# Patient Record
Sex: FEMALE | Race: WHITE
[De-identification: names, ages, dates, MRNs, and addresses within clinical notes are randomized per-mention and may not be internally consistent; named-entity substitution may affect disease eponyms.]

---

## 2014-06-02 VITALS
SYSTOLIC BLOOD PRESSURE: 95 MMHG | SYSTOLIC BLOOD PRESSURE: 95 MMHG | DIASTOLIC BLOOD PRESSURE: 63 MMHG | DIASTOLIC BLOOD PRESSURE: 63 MMHG | DIASTOLIC BLOOD PRESSURE: 63 MMHG | SYSTOLIC BLOOD PRESSURE: 95 MMHG

## 2016-05-09 VITALS — DIASTOLIC BLOOD PRESSURE: 83 MMHG | SYSTOLIC BLOOD PRESSURE: 144 MMHG

## 2017-11-07 ENCOUNTER — HOSPITAL ENCOUNTER (OUTPATIENT)
Dept: HOSPITAL 63 - DXRAD | Age: 47
Discharge: HOME | End: 2017-11-07
Attending: FAMILY MEDICINE
Payer: COMMERCIAL

## 2017-11-07 DIAGNOSIS — M25.531: Primary | ICD-10-CM

## 2017-11-07 PROCEDURE — 73110 X-RAY EXAM OF WRIST: CPT

## 2017-11-07 NOTE — RAD
4 view study of the right wrist:



Indications: Injured right wrist 6 weeks ago while on job. Continued right

wrist pain.



Findings: No acute fracture or dislocation or osteolytic process is seen.

Alignment is normal. No significant arthritic change is seen.



IMPRESSION: No significant osseous abnormality is evident.

## 2018-05-18 ENCOUNTER — HOSPITAL ENCOUNTER (OUTPATIENT)
Dept: HOSPITAL 63 - ER | Age: 48
Setting detail: OBSERVATION
LOS: 1 days | Discharge: HOME | End: 2018-05-19
Attending: INTERNAL MEDICINE | Admitting: INTERNAL MEDICINE
Payer: COMMERCIAL

## 2018-05-18 VITALS — BODY MASS INDEX: 32.99 KG/M2 | HEIGHT: 65 IN | WEIGHT: 198 LBS

## 2018-05-18 DIAGNOSIS — G40.309: ICD-10-CM

## 2018-05-18 DIAGNOSIS — R55: Primary | ICD-10-CM

## 2018-05-18 DIAGNOSIS — Z90.710: ICD-10-CM

## 2018-05-18 DIAGNOSIS — J32.2: ICD-10-CM

## 2018-05-18 DIAGNOSIS — I49.9: ICD-10-CM

## 2018-05-18 DIAGNOSIS — D72.829: ICD-10-CM

## 2018-05-18 DIAGNOSIS — Z90.49: ICD-10-CM

## 2018-05-18 DIAGNOSIS — Z82.49: ICD-10-CM

## 2018-05-18 DIAGNOSIS — I10: ICD-10-CM

## 2018-05-18 DIAGNOSIS — F17.210: ICD-10-CM

## 2018-05-18 DIAGNOSIS — Z82.3: ICD-10-CM

## 2018-05-18 LAB
AMPHETAMINE/METHAMPHETAMINE: (no result)
ANION GAP SERPL CALC-SCNC: 11 MMOL/L (ref 6–14)
APTT PPP: (no result) S
BACTERIA #/AREA URNS HPF: (no result) /HPF
BARBITURATES UR-MCNC: (no result) UG/ML
BASOPHILS # BLD AUTO: 0 X10^3/UL (ref 0–0.2)
BASOPHILS NFR BLD: 0 % (ref 0–3)
BENZODIAZ UR-MCNC: (no result) UG/L
BILIRUB UR QL STRIP: (no result)
CA-I SERPL ISE-MCNC: 11 MG/DL (ref 7–20)
CALCIUM SERPL-MCNC: 8.9 MG/DL (ref 8.5–10.1)
CANNABINOIDS UR-MCNC: (no result) UG/L
CHLORIDE SERPL-SCNC: 104 MMOL/L (ref 98–107)
CO2 SERPL-SCNC: 25 MMOL/L (ref 21–32)
COCAINE UR-MCNC: (no result) NG/ML
CREAT SERPL-MCNC: 0.8 MG/DL (ref 0.6–1)
EOSINOPHIL NFR BLD: 0.2 X10^3/UL (ref 0–0.7)
EOSINOPHIL NFR BLD: 1 % (ref 0–3)
ERYTHROCYTE [DISTWIDTH] IN BLOOD BY AUTOMATED COUNT: 12.7 % (ref 11.5–14.5)
ERYTHROCYTE [SEDIMENTATION RATE] IN BLOOD: 8 MM/H (ref 0–25)
FIBRINOGEN PPP-MCNC: CLEAR MG/DL
GFR SERPLBLD BASED ON 1.73 SQ M-ARVRAT: 76.9 ML/MIN
GLUCOSE SERPL-MCNC: 146 MG/DL (ref 70–99)
GLUCOSE UR STRIP-MCNC: (no result) MG/DL
HCT VFR BLD CALC: 36.5 % (ref 36–47)
HGB BLD-MCNC: 12.6 G/DL (ref 12–15.5)
LYMPHOCYTES # BLD: 2.4 X10^3/UL (ref 1–4.8)
LYMPHOCYTES NFR BLD AUTO: 17 % (ref 24–48)
MAGNESIUM SERPL-MCNC: 1.8 MG/DL (ref 1.8–2.4)
MCH RBC QN AUTO: 30 PG (ref 25–35)
MCHC RBC AUTO-ENTMCNC: 35 G/DL (ref 31–37)
MCV RBC AUTO: 88 FL (ref 79–100)
METHADONE SERPL-MCNC: (no result) NG/ML
MONO #: 0.7 X10^3/UL (ref 0–1.1)
MONOCYTES NFR BLD: 5 % (ref 0–9)
NEUT #: 10.8 X10^3UL (ref 1.8–7.7)
NEUTROPHILS NFR BLD AUTO: 76 % (ref 31–73)
NITRITE UR QL STRIP: (no result)
OPIATES UR-MCNC: (no result) NG/ML
PCP SERPL-MCNC: (no result) MG/DL
PLATELET # BLD AUTO: 283 X10^3/UL (ref 140–400)
POTASSIUM SERPL-SCNC: 3.5 MMOL/L (ref 3.5–5.1)
RBC # BLD AUTO: 4.15 X10^6/UL (ref 3.5–5.4)
RBC #/AREA URNS HPF: (no result) /HPF (ref 0–2)
SODIUM SERPL-SCNC: 140 MMOL/L (ref 136–145)
SP GR UR STRIP: 1.01
SQUAMOUS #/AREA URNS LPF: (no result) /LPF
UROBILINOGEN UR-MCNC: 0.2 MG/DL
WBC # BLD AUTO: 14.2 X10^3/UL (ref 4–11)
WBC #/AREA URNS HPF: 0 /HPF (ref 0–4)

## 2018-05-18 PROCEDURE — 85025 COMPLETE CBC W/AUTO DIFF WBC: CPT

## 2018-05-18 PROCEDURE — 96374 THER/PROPH/DIAG INJ IV PUSH: CPT

## 2018-05-18 PROCEDURE — 70450 CT HEAD/BRAIN W/O DYE: CPT

## 2018-05-18 PROCEDURE — 96361 HYDRATE IV INFUSION ADD-ON: CPT

## 2018-05-18 PROCEDURE — 93005 ELECTROCARDIOGRAM TRACING: CPT

## 2018-05-18 PROCEDURE — 36415 COLL VENOUS BLD VENIPUNCTURE: CPT

## 2018-05-18 PROCEDURE — G0378 HOSPITAL OBSERVATION PER HR: HCPCS

## 2018-05-18 PROCEDURE — G0479 DRUG TEST PRESUMP NOT OPT: HCPCS

## 2018-05-18 PROCEDURE — 81001 URINALYSIS AUTO W/SCOPE: CPT

## 2018-05-18 PROCEDURE — 85730 THROMBOPLASTIN TIME PARTIAL: CPT

## 2018-05-18 PROCEDURE — 99285 EMERGENCY DEPT VISIT HI MDM: CPT

## 2018-05-18 PROCEDURE — 85610 PROTHROMBIN TIME: CPT

## 2018-05-18 PROCEDURE — G0379 DIRECT REFER HOSPITAL OBSERV: HCPCS

## 2018-05-18 PROCEDURE — 85651 RBC SED RATE NONAUTOMATED: CPT

## 2018-05-18 PROCEDURE — 80048 BASIC METABOLIC PNL TOTAL CA: CPT

## 2018-05-18 PROCEDURE — 93880 EXTRACRANIAL BILAT STUDY: CPT

## 2018-05-18 PROCEDURE — 82553 CREATINE MB FRACTION: CPT

## 2018-05-18 PROCEDURE — 71045 X-RAY EXAM CHEST 1 VIEW: CPT

## 2018-05-18 PROCEDURE — 83880 ASSAY OF NATRIURETIC PEPTIDE: CPT

## 2018-05-18 PROCEDURE — 99406 BEHAV CHNG SMOKING 3-10 MIN: CPT

## 2018-05-18 PROCEDURE — 80307 DRUG TEST PRSMV CHEM ANLYZR: CPT

## 2018-05-18 PROCEDURE — 84484 ASSAY OF TROPONIN QUANT: CPT

## 2018-05-18 PROCEDURE — 83735 ASSAY OF MAGNESIUM: CPT

## 2018-05-18 PROCEDURE — 85379 FIBRIN DEGRADATION QUANT: CPT

## 2018-05-18 NOTE — RAD
EXAM: Head CT without contrast.

 

HISTORY: Near syncope.

 

TECHNIQUE: Computed tomographic images of the head were obtained without 

contrast. 

*One or more of the following individualized dose reduction techniques 

were utilized for this examination:  

1. Automated exposure control.  

2. Adjustment of the mA and/or kV according to patient size.  

3. Use of iterative reconstruction technique.

 

COMPARISON: None.

 

FINDINGS: There is no acute or subacute extra-axial or intraparenchymal 

hemorrhage. There is no mass effect or midline shift. There is no 

hydrocephalus. 

 

The gray-white matter differentiation pattern is intact. There is a 

prominent focus of slight hyperdensity in the region of the third 

ventricle, consistent with volume averaging of the brain parenchyma. This 

is not typical in appearance for a colloid cyst.

 

There is mild ethmoid sinus mucosal thickening. The mastoid air cells are 

unremarkable. No calvarial lesion is seen.

 

IMPRESSION: 

 

No acute intracranial findings.

 

Electronically signed by: Cindy Henley MD (5/18/2018 10:43 PM) [facility]

## 2018-05-18 NOTE — ED.ADGEN
Past History


Past Medical History:  Hypertension


Past Surgical History:  , Hysterectomy, Tonsillectomy, Tubal ligation


Smoking:  Cigarettes


Alcohol Use:  None


Drug Use:  None





Adult General


Chief Complaint


Chief Complaint


".... I don't feel right...I was trying to hang a mirror.. and I got really 

dizzy.. and had to lay down.. but I felt like I passed out... and when I woke 

up I had a very fast heart rate... I never have problems like this..."





HPI


HPI





Patient is a 47 year old female who presents with above hx and complaints of 

dizziness and possible complete syncope after laying on couch.  Pt. states she 

had a very rapid heart rate when she woke up.  Pt. did donate unit of blood 

yesterday.  Pt.states she never had syncope episode before.  Her life partner 

states she has know her for 19 yrs... and never had previous syncopal episode.  

Pt. does have HTN and recently change of meds Lisinopril  to Lisinopril with 

diuretic to reduce ankle edema.   Pt. Denies previous cardiac or dvt hx.   Does 

smoke.  No recent travel or specific ill contacts.  Pt. denies 

immunosuppression.   Pt. states upon awaking after dizzy / syncopal episode she 

felt very tired and her limbs felt very heavy.





Review of Systems


Review of Systems





Constitutional: Denies fever or chills []


Eyes: Denies change in visual acuity, redness, or eye pain []


HENT: Denies nasal congestion or sore throat []


Respiratory: Denies cough or shortness of breath []


Cardiovascular: No additional information not addressed in HPI []complaints of 

tachycardia


GI: Denies abdominal pain, nausea, vomiting, bloody stools or diarrhea []


: Denies dysuria or hematuria []


Musculoskeletal: Denies back pain or joint pain []


Integument: Denies rash or skin lesions []


Neurologic: Denies headache, focal weakness or sensory changes,. Complaints of 

dizziness


Endocrine: Denies polyuria or polydipsia []





All other systems were reviewed and found to be within normal limits, except as 

documented in this note.





Family History


Family History


Noncontributory





Current Medications


Current Medications





Current Medications








 Medications


  (Trade)  Dose


 Ordered  Sig/Zena  Start Time


 Stop Time Status Last Admin


Dose Admin


 


 Lactated Ringer's  1,000 ml @ 


 1,000 mls/hr  1X  ONCE  18 22:00


 18 22:59 DC 18 22:00


1,000 MLS/HR











Allergies


Allergies





Allergies








Coded Allergies Type Severity Reaction Last Updated Verified


 


  aspirin Allergy Intermediate Itching 8/27/15 Yes


 


  latex Allergy Intermediate Rash 8/27/15 Yes











Physical Exam


Physical Exam





Constitutional: Moderately acute distress, non-toxic appearance. []


HENT: Normocephalic, atraumatic, bilateral external ears normal, oropharynx 

moist, no oral exudates, nose normal. []


Eyes: PERRLA, EOMI, conjunctiva normal, no discharge. [] 


Neck: Normal range of motion, no tenderness, supple, no stridor. [] 


Cardiovascular: Tachycardia Heart rate regular rhythm, no murmur []


Lungs & Thorax:  Bilateral breath sounds equal with scattered wheezes 

auscultation []


Abdomen: Bowel sounds normal, soft, no tenderness, no masses, no pulsatile 

masses. Old surgery scars


Skin: Warm, dry, no erythema, no rash. [] 


Back: No tenderness, no CVA tenderness. [] 


Extremities: No tenderness, no cyanosis, no clubbing, ROM intact, no edema. [] 


Neurologic: Alert and oriented X 3, normal motor function, normal sensory 

function, no focal deficits noted. DTRs +2 patella and brachial.  equal. 

She is ambulatory without problems.


Psychologic: Affect anxious, judgement normal, mood normal. []





Current Patient Data


Lab Results





 Laboratory Tests








Test


 18


22:18 18


23:25


 


White Blood Count


 14.2 x10^3/uL


(4.0-11.0)  H 





 


Red Blood Count


 4.15 x10^6/uL


(3.50-5.40) 





 


Hemoglobin


 12.6 g/dL


(12.0-15.5) 





 


Hematocrit


 36.5 %


(36.0-47.0) 





 


Mean Corpuscular Volume


 88 fL ()


 





 


Mean Corpuscular Hemoglobin 30 pg (25-35)   


 


Mean Corpuscular Hemoglobin


Concent 35 g/dL


(31-37) 





 


Red Cell Distribution Width


 12.7 %


(11.5-14.5) 





 


Platelet Count


 283 x10^3/uL


(140-400) 





 


Neutrophils (%) (Auto) 76 % (31-73)  H 


 


Lymphocytes (%) (Auto) 17 % (24-48)  L 


 


Monocytes (%) (Auto) 5 % (0-9)   


 


Eosinophils (%) (Auto) 1 % (0-3)   


 


Basophils (%) (Auto) 0 % (0-3)   


 


Neutrophils # (Auto)


 10.8 x10^3uL


(1.8-7.7)  H 





 


Lymphocytes # (Auto)


 2.4 x10^3/uL


(1.0-4.8) 





 


Monocytes # (Auto)


 0.7 x10^3/uL


(0.0-1.1) 





 


Eosinophils # (Auto)


 0.2 x10^3/uL


(0.0-0.7) 





 


Basophils # (Auto)


 0.0 x10^3/uL


(0.0-0.2) 





 


Erythrocyte Sedimentation Rate 8 (0-25)   


 


Prothrombin Time


 9.3 SEC


(9.4-11.4)  L 





 


Prothrombin Time INR 0.9 (0.9-1.1)   


 


PTT


 21 SEC (23-33)


L 





 


Sodium Level


 140 mmol/L


(136-145) 





 


Potassium Level


 3.5 mmol/L


(3.5-5.1) 





 


Chloride Level


 104 mmol/L


() 





 


Carbon Dioxide Level


 25 mmol/L


(21-32) 





 


Anion Gap 11 (6-14)   


 


Blood Urea Nitrogen


 11 mg/dL


(7-20) 





 


Creatinine


 0.8 mg/dL


(0.6-1.0) 





 


Estimated GFR


(Cockcroft-Gault) 76.9  


 





 


Glucose Level


 146 mg/dL


(70-99)  H 





 


Calcium Level


 8.9 mg/dL


(8.5-10.1) 





 


Magnesium Level


 1.8 mg/dL


(1.8-2.4) 





 


Creatine Kinase


 74 U/L


() 





 


Creatine Kinase MB (Mass)


 0.6 ng/mL


(0.0-3.6) 





 


Creatine Kinase MB Relative


Index 0.8 % (0-4)  


 





 


Troponin I Quantitative


 < 0.017 ng/mL


(0-0.055) 





 


NT-Pro-B-Type Natriuretic


Peptide 27 pg/mL


(0-124) 





 


Urine Collection Type  Unknown  


 


Urine Color  Straw  


 


Urine Clarity  Clear  


 


Urine pH  6.0  


 


Urine Specific Gravity  1.015  


 


Urine Protein


 


 Neg


(NEG-TRACE)


 


Urine Glucose (UA)


 


 Neg mg/dL


(NEG)


 


Urine Ketones (Stick)


 


 Neg mg/dL


(NEG)


 


Urine Blood  Trace (NEG)  


 


Urine Nitrite  Neg (NEG)  


 


Urine Bilirubin  Neg (NEG)  


 


Urine Urobilinogen Dipstick


 


 0.2 mg/dL (0.2


mg/dL)


 


Urine Leukocyte Esterase  Neg (NEG)  


 


Urine RBC


 


 1-2 /HPF (0-2)





 


Urine WBC  0 /HPF (0-4)  


 


Urine Squamous Epithelial


Cells 


 Few /LPF  





 


Urine Bacteria


 


 Few /HPF


(0-FEW)


 


Urine Mucus  Slight /LPF  


 


Urine Opiates Screen  Neg (NEG)  


 


Urine Methadone Screen  Neg (NEG)  


 


Urine Barbiturates  Neg (NEG)  


 


Urine Phencyclidine Screen  Neg (NEG)  


 


Urine


Amphetamine/Methamphetamine 


 Neg (NEG)  





 


Urine Benzodiazepines Screen  Neg (NEG)  


 


Urine Cocaine Screen  Neg (NEG)  


 


Urine Cannabinoids Screen  Neg (NEG)  


 


Urine Ethyl Alcohol  Neg (NEG)  











EKG


EKG


My interpretation of EKG shows a sinus rhythm at 96 bpm. This was nonspecific 

contour changes anterior septal region. But no findings of acute STEMI with 

contralateral changes.[]





Radiology/Procedures


Radiology/Procedures


I interpretation chest x-ray shows no acute cardiopulmonary findings.  I 

interpretation CT head shows no shift, mass, edema, bleed, or fracture. See 

formal report when available.[]





Course & Med Decision Making


Course & Med Decision Making


Pertinent Labs and Imaging studies reviewed. (See chart for details).  

Discussed presentation, testing and tx. plan with Dr. Kraus, will admit for 

further eval and tx. 





[]





Final Impression


Final Impression


1. Syncopal event


2. Leukocytosis


3. Ethmoid Sinusitis


4. Hx. HTN


5. Tobacco Use





Dragon Disclaimer


Dragon Disclaimer


This electronic medical record was generated, in whole or in part, using a 

voice recognition dictation system.











IRAM SCHAFFER MD May 18, 2018 21:57

## 2018-05-18 NOTE — RAD
EXAM: Chest, single view.

 

HISTORY: Near syncope.

 

COMPARISON: None.

 

FINDINGS: A frontal view of the chest is obtained. There is no infiltrate,

effusion or pneumothorax. The heart is normal in size.

 

IMPRESSION: No acute pulmonary finding.

 

Electronically signed by: Cindy Henley MD (5/18/2018 10:43 PM) [facility]

## 2018-05-19 VITALS
SYSTOLIC BLOOD PRESSURE: 97 MMHG | DIASTOLIC BLOOD PRESSURE: 62 MMHG | DIASTOLIC BLOOD PRESSURE: 62 MMHG | SYSTOLIC BLOOD PRESSURE: 97 MMHG | SYSTOLIC BLOOD PRESSURE: 97 MMHG | DIASTOLIC BLOOD PRESSURE: 62 MMHG | DIASTOLIC BLOOD PRESSURE: 62 MMHG | SYSTOLIC BLOOD PRESSURE: 97 MMHG

## 2018-05-19 VITALS — DIASTOLIC BLOOD PRESSURE: 72 MMHG | SYSTOLIC BLOOD PRESSURE: 125 MMHG

## 2018-05-19 VITALS — SYSTOLIC BLOOD PRESSURE: 110 MMHG | DIASTOLIC BLOOD PRESSURE: 65 MMHG

## 2018-05-19 VITALS — SYSTOLIC BLOOD PRESSURE: 128 MMHG | DIASTOLIC BLOOD PRESSURE: 70 MMHG

## 2018-05-19 RX ADMIN — Medication SCH APP: at 09:00

## 2018-05-19 RX ADMIN — Medication SCH APP: at 13:00

## 2018-05-19 RX ADMIN — IPRATROPIUM BROMIDE AND ALBUTEROL SULFATE SCH ML: .5; 3 SOLUTION RESPIRATORY (INHALATION) at 05:44

## 2018-05-19 RX ADMIN — IPRATROPIUM BROMIDE AND ALBUTEROL SULFATE SCH ML: .5; 3 SOLUTION RESPIRATORY (INHALATION) at 12:00

## 2018-05-19 RX ADMIN — IPRATROPIUM BROMIDE AND ALBUTEROL SULFATE SCH ML: .5; 3 SOLUTION RESPIRATORY (INHALATION) at 16:00

## 2018-05-19 NOTE — SSS
ADMIT DATE:  2018



HISTORY OF PRESENT ILLNESS:  The patient is a 47-year-old  female

patient without any previous cardiac history, who presented to the Emergency

Room with an episode of syncope.  She was apparently trying to hang something

when she felt dizzy and had to lie down.  Subsequently, she passed out.  She

denied any previous syncopal episode.  She has had mild retrosternal chest

discomfort and shortness of breath after she regained her consciousness, she

denied any exertional component.  She denied any palpitation, orthopnea.  She

was seen in the Emergency Room and her first set of cardiac enzyme was negative

at less than 0.017.  She was seen in consultation by the Cardiology team as well

as the neurologist.  Her CT scan of the head was normal and bilateral carotid

Doppler showed minimal atheromatous plaque formation is seen involving both

carotid bifurcation.  The peak systolic velocities are not significantly

elevated and no hemodynamically significant stenosis seen.  Vertebral arteries

demonstrate normal antegrade flow and the cardiologist recommended that the

patient can be discharged and to arrange for the two-dimensional echocardiogram

to assess the ventricular systolic function and to rule out structural

abnormalities.  An event monitor recording to rule out any significant

arrhythmias, both can be done as an outpatient and basically, was discharged

home to follow with the cardiologist and Dr. Steele, the neurologist for

outpatient electroencephalogram.



PAST MEDICAL HISTORY:  Significant for hypertension.



PAST SURGICAL HISTORY:  Significant for  section, hysterectomy,

tonsillectomy, tubal ligation.



FAMILY HISTORY:  Positive for premature coronary artery disease.



SOCIAL HISTORY:  The patient current smoker, but denied any alcohol, drug use.



ALLERGIES:  She is allergic to ASPIRIN and LATEX.



MEDICATIONS:  She is currently on lisinopril/hydrochlorothiazide 10/12.5 mg once

a day.



REVIEW OF SYSTEMS:  As per history of present illness.



PHYSICAL EXAMINATION:

GENERAL:  On arrival to the Emergency Room, she looked well and was clearly in

no apparent respiratory distress, pale, but no jaundice, cyanosis, or

thyromegaly.  No jugular venous distension, no limb edema.

VITAL SIGNS:  Her heart rate was 95, blood pressure 120/72, temperature was

98.6, respiratory rate was 20, and oxygen saturation was 98% on room air.

HEAD, EYES, EARS, NOSE, AND THROAT:  Normocephalic, atraumatic.

NECK:  Supple.

HEART:  Showed normal first and second heart sounds with no gallop, rub or

murmur.

CHEST:  Clear to auscultation.  No crepitation or rhonchi.

ABDOMEN:  Distended, soft, and nontender.  No guarding or rigidity.  No

organomegaly.  Hernial orifice intact.  Bowel sounds normal.

NEUROLOGIC:  She was awake, alert, responding appropriately.  Cranial nerves

intact.  She moves extremities without difficulty.  She ambulates without

assistance or assistive devices.



LABORATORY DATA:  While in the Emergency Room, her white cell count was slightly

elevated at 14,200, hemoglobin 12.6, hematocrit 36.5, MCV 88, and platelet count

of 163,000.  Her prothrombin time was 9.3, INR 0.9, aPTT was 21, and D-dimer was

less than 0.19.  Her chemistry showed a serum sodium 140, potassium 3.5,

chloride 104, bicarbonate 25, anion gap of 11, BUN 11, creatinine 0.8, estimated

GFR was 77 mL per minute.  Her glucose 146, calcium was 8.9, magnesium was 1.8. 

She has 2 sets of cardiac enzymes that were negative.  Urinalysis was

unremarkable.  The urine was negative for nitrite and leukocyte esterase was

only 1-2 rbc's, 0 wbc's, and very few bacteria.  Her toxic screen was basically

negative.  Her imaging studies showed that her CT scan of the head showed no

acute or subacute extraaxial and parenchymal hemorrhage.  There is no mass

effect or midline shift.  There is no hydrocephalus.  The gray-white matter

differentiation pattern is intact.  There is a prominent focus with a slight

hyperdensity in the region of the third ventricle consistent with volume

averaging of the brain parenchyma.  This is not typical in appearance for a

colloid cyst.  There is mild ethmoid sinus mucosal thickening.  The mastoid air

cells are unremarkable.  No calvarial lesion is seen.  Her chest x-ray showed no

acute pulmonary finding and the carotid Doppler showed minimal atheromatous

plaque formation is seen involving both carotid bifurcation.  The peak systolic

velocities are not significantly elevated and no hemodynamically significant

stenosis seen.  Both neurologist and the cardiologist stated that the patient

can safely be discharged for echocardiogram and event monitor to be done as an

outpatient and symptoms for an EEG to be done as an outpatient.



FINAL DISCHARGE DIAGNOSES:  Syncopal episode with unrevealing lab tests and

imaging studies, hypertension.





______________________________

THOMAS WADE MD



DR:  MUMTAZ/dixie  JOB#:  [id number] / [id number]

DD:  2018 18:11  DT:  2018 19:58

## 2018-05-19 NOTE — PDOC2
CONSULT


Date of Admission


DATE: 18 


TIME: 11:31


Reason for Consult:


Syncope


Referring Physician:


Dr. Kraus


Chief Complaint


Syncope


Source:  Chart review, Patient


Problem List


Problems


Medical Problems:


(1) Syncope


Status: Acute  








History of Present Illness


47-year-old female without any previous cardiac history presented after she had 

an episode of syncope. She was apparently trying to hang amiodarone when she 

felt dizzy and had to lie down. Subsequently she passed out. She denied any 

previous syncopal episodes. She stated that she had mild retrosternal chest 

discomfort and shortness of breath after she regained consciousness. She denied 

any exertional component. She also denied any palpitations or orthopnea/PND.


Past Medical History


Hypertension


Past Surgical History


 section


Hysterectomy


Tonsillectomy


Tubal ligation


Family History


Positive for premature coronary artery disease


Social History


Patient is a current smoker but denied any alcohol or drug use


Current Medications





Current Medications


Lactated Ringer's 1,000 ml @  1,000 mls/hr 1X  ONCE IV  Last administered on at 22:00;  Start 18 at 22:00;  Stop 18 at 22:59;  Status DC


Ceftriaxone Sodium 1 gm/ Sodium Chloride 50 ml @  100 mls/hr 1X  ONCE IV ;  

Start 18 at 23:45;  Stop 18 at 00:14;  Status UNV


Ceftriaxone Sodium (Rocephin) 1 gm ONCE  ONCE IVP  Last administered on at 00:30;  Start 18 at 00:30;  Stop 18 at 00:31;  Status DC


Ondansetron HCl (Zofran) 4 mg PRN Q4HRS  PRN IV NAUSEA/VOMITING;  Start 18 

at 00:30;  Stop 18 at 00:29


Albuterol/ Ipratropium (Duoneb) 3 ml RTQID NEB ;  Start 18 at 08:00;  Stop 

18 at 07:59


Ceftriaxone Sodium 1 gm/ Sodium Chloride 50 ml @  100 mls/hr DAILY IV ;  Start  at 09:00;  Status UNV


Sodium Chloride (Saline Mist Nasal) 1 jose c QID NS ;  Start 18 at 09:00


Fluticasone Propionate (Flonase) 2 spray DAILY NS ;  Start 18 at 09:00


Ceftriaxone Sodium (Rocephin) 1 gm Q24H IVP ;  Start 18 at 21:00





Active Scripts


Active


Reported


Lisinopril-Hctz 10-12.5 Mg Tab (Lisinopril/Hydrochlorothiazide) 1 Each Tablet 1 

Tab PO DAILY


Allergies:  


Coded Allergies:  


     aspirin (Verified  Allergy, Intermediate, Itching, 8/27/15)


     latex (Verified  Allergy, Intermediate, Rash, 8/27/15)


PSYCHOLOGICAL ROS:  No: Hallucinations


Eyes:  No: Loss of vision


HEENT:  No: Epistaxis


Respiratory:  YES: Shortness of breath


Cardiovascular:  yes: Chest Pain


Genitourinary:  No: Henaturia


Neurological:  YES: Other (syncope); 


   No: Seizures


General:  Alert, Oriented X3


HEENT:  Atraumatic, PERRLA


Lungs:  Clear to auscultation


Heart:  Regular rate


Abdomen:  Soft, No tenderness


Extremities:  No edema


Psych/Mental Status:  Mood NL


VITALS





Vital Signs








  Date Time  Temp Pulse Resp B/P (MAP) Pulse Ox O2 Delivery O2 Flow Rate FiO2


 


18 08:00      Room Air  


 


18 05:37 98.6 80 18 110/65 (80) 96   








Labs





Laboratory Tests








Test


 18


22:18 18


23:25 18


00:30 18


03:25


 


White Blood Count


 14.2 x10^3/uL


(4.0-11.0) 


 


 





 


Red Blood Count


 4.15 x10^6/uL


(3.50-5.40) 


 


 





 


Hemoglobin


 12.6 g/dL


(12.0-15.5) 


 


 





 


Hematocrit


 36.5 %


(36.0-47.0) 


 


 





 


Mean Corpuscular Volume 88 fL ()    


 


Mean Corpuscular Hemoglobin 30 pg (25-35)    


 


Mean Corpuscular Hemoglobin


Concent 35 g/dL


(31-37) 


 


 





 


Red Cell Distribution Width


 12.7 %


(11.5-14.5) 


 


 





 


Platelet Count


 283 x10^3/uL


(140-400) 


 


 





 


Neutrophils (%) (Auto) 76 % (31-73)    


 


Lymphocytes (%) (Auto) 17 % (24-48)    


 


Monocytes (%) (Auto) 5 % (0-9)    


 


Eosinophils (%) (Auto) 1 % (0-3)    


 


Basophils (%) (Auto) 0 % (0-3)    


 


Neutrophils # (Auto)


 10.8 x10^3uL


(1.8-7.7) 


 


 





 


Lymphocytes # (Auto)


 2.4 x10^3/uL


(1.0-4.8) 


 


 





 


Monocytes # (Auto)


 0.7 x10^3/uL


(0.0-1.1) 


 


 





 


Eosinophils # (Auto)


 0.2 x10^3/uL


(0.0-0.7) 


 


 





 


Basophils # (Auto)


 0.0 x10^3/uL


(0.0-0.2) 


 


 





 


Erythrocyte Sedimentation Rate 8 (0-25)    


 


Prothrombin Time


 9.3 SEC


(9.4-11.4) 


 


 





 


Prothromb Time International


Ratio 0.9 (0.9-1.1) 


 


 


 





 


Activated Partial


Thromboplast Time 21 SEC (23-33) 


 


 


 





 


Sodium Level


 140 mmol/L


(136-145) 


 


 





 


Potassium Level


 3.5 mmol/L


(3.5-5.1) 


 


 





 


Chloride Level


 104 mmol/L


() 


 


 





 


Carbon Dioxide Level


 25 mmol/L


(21-32) 


 


 





 


Anion Gap 11 (6-14)    


 


Blood Urea Nitrogen


 11 mg/dL


(7-20) 


 


 





 


Creatinine


 0.8 mg/dL


(0.6-1.0) 


 


 





 


Estimated GFR


(Cockcroft-Gault) 76.9 


 


 


 





 


Glucose Level


 146 mg/dL


(70-99) 


 


 





 


Calcium Level


 8.9 mg/dL


(8.5-10.1) 


 


 





 


Magnesium Level


 1.8 mg/dL


(1.8-2.4) 


 


 





 


Creatine Kinase


 74 U/L


() 


 


 





 


Creatine Kinase MB (Mass)


 0.6 ng/mL


(0.0-3.6) 


 


 





 


Creatine Kinase MB Relative


Index 0.8 % (0-4) 


 


 


 





 


Troponin I Quantitative


 < 0.017 ng/mL


(0-0.055) 


 


 < 0.017 ng/mL


(0-0.055)


 


NT-Pro-B-Type Natriuretic


Peptide 27 pg/mL


(0-124) 


 


 





 


Urine Collection Type  Unknown   


 


Urine Color  Straw   


 


Urine Clarity  Clear   


 


Urine pH  6.0   


 


Urine Specific Gravity  1.015   


 


Urine Protein


 


 Neg


(NEG-TRACE) 


 





 


Urine Glucose (UA)


 


 Neg mg/dL


(NEG) 


 





 


Urine Ketones (Stick)


 


 Neg mg/dL


(NEG) 


 





 


Urine Blood  Trace (NEG)   


 


Urine Nitrite  Neg (NEG)   


 


Urine Bilirubin  Neg (NEG)   


 


Urine Urobilinogen Dipstick


 


 0.2 mg/dL (0.2


mg/dL) 


 





 


Urine Leukocyte Esterase  Neg (NEG)   


 


Urine RBC  1-2 /HPF (0-2)   


 


Urine WBC  0 /HPF (0-4)   


 


Urine Squamous Epithelial


Cells 


 Few /LPF 


 


 





 


Urine Bacteria


 


 Few /HPF


(0-FEW) 


 





 


Urine Mucus  Slight /LPF   


 


Urine Opiates Screen  Neg (NEG)   


 


Urine Methadone Screen  Neg (NEG)   


 


Urine Barbiturates  Neg (NEG)   


 


Urine Phencyclidine Screen  Neg (NEG)   


 


Urine


Amphetamine/Methamphetamine 


 Neg (NEG) 


 


 





 


Urine Benzodiazepines Screen  Neg (NEG)   


 


Urine Cocaine Screen  Neg (NEG)   


 


Urine Cannabinoids Screen  Neg (NEG)   


 


Urine Ethyl Alcohol  Neg (NEG)   


 


D-Dimer (Nancy)


 


 


 < 0.19 mg/L


(0.00-0.50) 











Assessment/Plan


1.  Syncope of uncertain etiology. EKG showed sinus rhythm and telemetry did 

not show any significant arrhythmias. We will check 2-D echo to assess LV 

function and rule out structural abnormalities and event monitor recording to 

rule out any significant arrhythmias. However, these could be done as an 

outpatient.


2.  Hypertension:  Controlled


Thank you for your consultation











MARCIO VIZCAINO MD May 19, 2018 11:31

## 2018-05-19 NOTE — RAD
Bilateral carotid arterial duplex study 5/19/2018

 

Clinical History: Syncope.

 

Technique: Using a combination of real-time ultrasound imaging and 

color-flow and pulse Doppler imaging techniques, duplex evaluation of the 

carotid and vertebral arterial structures within the neck was performed. 

Multiple images were obtained. Velocity measurements estimating the degree

of stenosis are based on NASCET criteria.

 

Findings: Minimal atheromatous plaque formation is seen involving both 

carotid bifurcations. The peak systolic velocities are not significantly 

elevated. No hemodynamically significant stenosis is seen.

 

The vertebral arteries demonstrate normal antegrade flow.

 

Impression: Essentially negative study.

 

Electronically signed by: Jayson Freitas MD (5/19/2018 10:45 AM) [facility]

## 2018-05-19 NOTE — EKG
[facility] [address], [place] [address]

Test Date:    2018               Test Time:    22:00:09

Pat Name:     RICHMOND FLEMING            Department:   

Patient ID:   [id number]           Room:          

Gender:       F                        Technician:   ROXANN

:          1970               Requested By: IRAM SCHAFFER

Order Number: [id number]            Reading MD:     

                                 Measurements

Intervals                              Axis          

Rate:         96                       P:            39

NV:           164                      QRS:          40

QRSD:         80                       T:            37

QT:           336                                    

QTc:          425                                    

                           Interpretive Statements

SINUS RHYTHM

QRS(T) CONTOUR ABNORMALITY

CONSIDER ANTEROSEPTAL MYOCARDIAL DAMAGE

CONSIDER INFERIOR MYOCARDIAL DAMAGE

POSSIBLY ABNORMAL ECG

RI6.01

No previous ECG available for comparison

## 2018-05-20 NOTE — CONS
DATE OF CONSULTATION:  2018



NEUROLOGY CONSULTATION



REFERRING PHYSICIAN:  Dr. Kraus.



REASON FOR CONSULTATION:  New onset of syncope.



HISTORY OF PRESENT ILLNESS:  This is a 47-year-old right-handed  female

who was admitted to Emergency Room last night after she presented with a sudden

onset of fainting spells.  According to the patient, she was hanging a mirror

and all of a sudden she felt lightheadedness.  She lied down on the couch and

she had some nausea, but no vomiting or headaches.  Subsequently, she passed out

on the couch for unknown period of time.  The patient had palpitations and some

dizziness.  She also complains of retrosternal chest discomfort.  The patient

never had any similar episode in the past.  Currently she denies headaches,

visual disturbances, numbness or paresthesia or weakness.  She denies exertional

shortness of breath.  According to the patient, she donated 1 unit of blood

yesterday and recently her lisinopril has changed to lisinopril with diuretic. 

The patient denies any recent head injuries or fall or any recent illnesses.



PAST MEDICAL HISTORY:  Significant for hypertension.



PAST SURGICAL HISTORY:  Significant for , hysterectomy, tonsillectomy,

tubal ligation, lumpectomy.



SOCIAL HISTORY:  The patient is .  She has 3 children.  She is a

.  She smokes 1 pack of cigarettes daily.  She denies

alcohol drinking or illicit drug use.



FAMILY HISTORY:  There is a family history of premature coronary artery disease

in her father's side.  Father had a stroke, hypertension and a brain aneurysm. 

Mother had hypertension.



CURRENT HOME MEDICATIONS:  Lisinopril/hydrochlorothiazide.



ALLERGIES:  ASPIRIN AND LATEX.



REVIEW OF SYSTEMS:  A 10-point review of system was performed and consistent

with recent syncope, lightheadedness, otherwise unremarkable.



PHYSICAL EXAMINATION:

GENERAL:  Well-developed, well-nourished white female, not in acute distress. 

She weighs 198 pounds.

VITAL SIGNS:  Blood pressure 97/62, respiratory rate 16, pulse 76 and regular,

temperature 98.1, oxygen saturation 95% on room air.

HEENT:  Normocephalic, atraumatic, otherwise unremarkable.

NECK:  Supple.  Negative for carotid bruit, lymphadenopathy or thyromegaly.

LUNGS:  Clear to A and P.

CARDIOVASCULAR:  Regular rate and rhythm, normal S1, S2.  There is no S3, S4 or

murmur.

ABDOMEN:  Soft.  Bowel sounds positive.

EXTREMITIES:  Negative for cyanosis, clubbing or pitting edema.

MENTAL STATUS:  The patient is alert and oriented x 3.  Speech is fluent.  There

is no language dysfunction.  Memory, judgment, and abstract thinking are normal.

 The patient denies hallucination or delusion.

CRANIAL NERVES:  Visual fields are full.  The pupils are reactive to light and

accommodation.  The extraocular movements are intact.  There is no nystagmus. 

There is no facial motor or sensory deficit.  Hearing is intact bilaterally. 

The palate is elevated symmetrically.  Sternocleidomastoid muscles are powerful

bilaterally.  The patient shrugs her shoulders symmetrically, protrudes her

tongue in the midline without fasciculation or atrophy.

MOTOR:  No focal muscle bulk was seen.  The tone is normal.  The strength is 5/5

throughout.  Sensory examination revealed normal pinprick, light touch,

vibratory and position senses.  Deep tendon reflexes were symmetric and active

without pathology responses.  Gait and coordination normal.



DIAGNOSTIC DATA:  Carotid Doppler study revealed no evidence of acute stenosis. 

A chest x-ray revealed no evidence of acute cardiopulmonary process.  A head CT

scan revealed no evidence of acute intracranial process otherwise unremarkable.



LABORATORY DATA:  CBC revealed white blood cells 14,200; hemoglobin 12.6;

hematocrit 36.5; platelet count 283,000.  We have a neutrophil of 76%. 

Chemistry revealed sodium of 140, potassium 3.5, chloride 104, CO2 of 25, BUN

11, creatinine 0.8, glucose 146, calcium 8.9.  Liver enzymes are normal. 

Troponin level is normal.  Urinalysis:  No evidence of urinary tract infections

and urine drug screen is negative.



IMPRESSION:

1.  New syncopal attack, etiology uncertain, rule out cardiac arrhythmia,

orthostatic hypotension.

2.  Non-convulsive seizure not completely ruled out.

3.  History of hypertension.

4.  Smoking, with premature coronary artery disease in the family.

5.  Leukocytosis with left shift.



RECOMMENDATIONS:

1.  Workup for syncope includes Holter monitoring to rule out cardiac arrhythmia

and echocardiogram to rule out structural lesions.

2.  We will arrange for an EEG to be done on outpatient basis after discharge.

3.  The patient has been started on antibiotics for possible unrecognized

systemic infection.

4.  Continue with current management initiated by Dr. Kraus and Cardiology

services.





______________________________

M ARVIN DURAN MD



DR:  Laz  JOB#:  [id number] / [id number]

DD:  2018 15:27  DT:  2018 13:14

## 2018-06-12 ENCOUNTER — HOSPITAL ENCOUNTER (OUTPATIENT)
Dept: HOSPITAL 63 - NM | Age: 48
Discharge: HOME | End: 2018-06-12
Payer: COMMERCIAL

## 2018-06-12 DIAGNOSIS — F17.210: ICD-10-CM

## 2018-06-12 DIAGNOSIS — R07.9: ICD-10-CM

## 2018-06-12 DIAGNOSIS — I10: Primary | ICD-10-CM

## 2018-06-12 PROCEDURE — 96375 TX/PRO/DX INJ NEW DRUG ADDON: CPT

## 2018-06-12 PROCEDURE — A9500 TC99M SESTAMIBI: HCPCS

## 2018-06-12 PROCEDURE — 96376 TX/PRO/DX INJ SAME DRUG ADON: CPT

## 2018-06-12 PROCEDURE — 93306 TTE W/DOPPLER COMPLETE: CPT

## 2018-06-12 PROCEDURE — 93017 CV STRESS TEST TRACING ONLY: CPT

## 2018-06-12 PROCEDURE — 78452 HT MUSCLE IMAGE SPECT MULT: CPT

## 2018-06-12 PROCEDURE — 96374 THER/PROPH/DIAG INJ IV PUSH: CPT

## 2018-06-12 NOTE — RAD
MR#: [id number]

Account#: [id number]

Accession#: [id number]

Date of Study: 06/12/2018

Ordering Physician: MARCIO VIZCAINO 

Referring Physician: ANNAMARIE RICHEY Tech: RT Pilo (R) (N)





--------------- APPROVED REPORT --------------





Test Type:          Pharmacological

Stress Nurse/Tech: СВЕТЛАНА Eid

Test Indications: CP

Cardiac History: No known cardiac

Medications:     See EHR

Medical History: See Electronic Medical Record

Resting Heart Rate: 63 bpm

Resting Blood Pressure: 128/69mmHg

Pretest Chest Pain: No chest pain



Pharm. Details

Pharmacologic stress testing was performed using 0.4mg per 5ml of regadenoson given intravenously ove
r 7-10 seconds.



Stress Symptoms

Dyspnea

No acute changes on EKG



POST EXERCISE

Reason for Termination: Infusion complete

Max HR: 102 bpm

Max Blood Pressure: 120/64mmHg

Blood Pressure response to exercise: Normal blood pressure response during stress.

Heart Rate response to exercise: Normal

Chest Pain: No. 

Arrhythmia: No. 

ST Change: No. 



INTERPRETATION

Stress EKG Conclusion: No acute changes



Imaging Protocol

IMAGE PROTOCOL: Rest Tc-99m/stress Tc-99m 1 day



Rest:            Stress:         Viability:   

Radiopharm.Tc99m VznhkifnuEj83t Sestamibi

[id number]            33mCi            

Duration    20min.           15min.           

Img Date  06/12/2018 06/12/2018      

Inj-Img [id number].           45min.           



Rest Admin Site:IV - Left AntecubitalAdministrator: RT Pilo (R)(N)

Stress Admin Site: IV - Left AntecubitalAdministrator: RT Pilo (R)(N)



STRESS DATA

End Diast. Vol.103.0mlAv. Heart Rate79.0bpm

LVEDV index BSA2.0mlCardiac Output0.1L/min

End Syst. Vol.19.0mlCO Index BSA6.7L/min

LVESV index BSA0.0mlMyocardial Wdbg131.0g

Eject. Vrdgakqp35.0%



Stress Rates

Pk. Fill Rate2.80EDV/secLVtime Pk. Fill 132.86msec

Pk. Empty Rate3.84ESV/secLVtime Pk. Itcot777.43msec

1/3 Pk. Fill1.77EDV/sec



Stress Scores

Regional WT0.00Summed WT0.00

Regional WM0.00Summed WM0.00



The rest and stress images show normal perfusion, normal contraction and thickening.



LV Perf. Quant

17 Seg. SSS0.00

17 Seg. SRS1.00

17 Seg. SDS0.00

Stress Defect Extent (% LAD)0.00Rest Defect Extent (% LAD)0.00Rev. Defect Extent (% LAD)0.00

Stress Defect Extent (% LCX) 0.00Rest Defect Extent (% LCX)0.00Rev. Defect Extent (% LCX)0.00

Stress Defect Extent (% RCA)0.00Rest Defect Extent (% RCA)0.00Rev. Defect Extent (% RCA)0.00

Stress Defect Extent (% SHERIF)0.00Rest Defect Extent (% SHERIF)0.00Rev. Defect Extent (% SHERIF)0.00



Other Information

Quality:Good

Risk Assessment: Low Risk



Conclusion

1. No evidence of EKG changes with stress testing.

2. Normal perfusion at stress/rest.

3. Low risk study.

4. EF > 60%.



Signed by : Marcus Alexander, 

Electronically Approved : 06/12/2018 13:23:46

## 2018-06-12 NOTE — CARD
MR#: [id number]

Account#: [id number]

Accession#: [id number]

Date of Study: 06/12/2018

Ordering Physician: MARCIO VIZCAINO, 

Referring Physician: MARCIO VIZCAINO, 

Tech: ERNESTO Cruz





--------------- APPROVED REPORT --------------





EXAM: Two-dimensional and M-mode echocardiogram with Doppler and color Doppler.



Other Information 

Quality : Fair



INDICATION

Hypertension/HCVD

Chest Pain 



2D DIMENSIONS 

Left Atrium(2D)3.6 (1.6-4.0cm)IVSd1.0 (0.7-1.1cm)

Aortic Root(2D)2.8 (2.0-3.7cm)LVDd4.2 (3.9-5.9cm)

LVOT Diameter2.2 (1.8-2.4cm)PWd1.2 (0.7-1.1cm)

LVDs2.4 (2.5-4.0cm)FS (%) 33.0 %

SV60.5 mlLVEF(%)67.0 (>50%)



Aortic Valve

AoV Peak Huang.142.2cm/Favio Peak GR.8.1mmHg

LVOT Peak Huang.82.4cm/sAVA (VMAX)2.24cm2



Mitral Valve

MV E Acklggir59.4cm/sMV DECEL MJUE572at

MV A Cvzimhlk52.2cm/sE/A  Ratio1.0



Tricuspid Valve

TR P. Tojxbnec030xm/sRAP HSESYPKZ474jlWh

TR Peak Gr.17mmHg



 LEFT VENTRICLE 

The left ventricle is normal size. There is normal left ventricular wall thickness. The left ventricu
lar systolic function is normal and the ejection fraction is within normal range. The Ejection Fracti
on is 65-70%. There is normal LV segmental wall motion. The left ventricular diastolic function and f
illing is normal for age.



 RIGHT VENTRICLE 

The right ventricle is normal size. There is normal right ventricular wall thickness. The right ventr
icular systolic function is normal.



 ATRIA 

The left atrium size is normal. The right atrium size is normal. The interatrial septum is intact wit
h no evidence for an atrial septal defect or patent foramen ovale as noted on 2-D or Doppler imaging.




 AORTIC VALVE 

The aortic valve is trileaflet. Doppler and Color Flow revealed no significant aortic regurgitation. 
There is no significant aortic valvular stenosis.



 MITRAL VALVE 

The mitral valve is normal in structure and function. There is no mitral valve stenosis. Doppler and 
Color Flow revealed no mitral valve regurgitation noted.



 TRICUSPID VALVE 

The tricuspid valve is normal in structure and function. Doppler and Color Flow revealed no tricuspid
 valve regurgitation noted. There is no tricuspid valve stenosis. 



 PULMONIC VALVE 

The pulmonic valve is not well visualized. Doppler and Color Flow revealed no pulmonic valvular regur
gitation. There is no pulmonic valvular stenosis.



 GREAT VESSELS 

The aortic root is normal in size. The IVC is normal in size and collapses >50% with inspiration.



 PERICARDIAL EFFUSION 

There is no pleural effusion. There is no evidence of significant pericardial effusion.



Critical Notification

Critical Value: No



<Conclusion>

The left ventricular systolic function is normal and the ejection fraction is within normal range. Th
e Ejection Fraction is 65-70%.

There is normal LV segmental wall motion.



Signed by : Marcus Alexander, 

Electronically Approved : 06/12/2018 13:03:00

## 2019-01-07 ENCOUNTER — HOSPITAL ENCOUNTER (OUTPATIENT)
Dept: HOSPITAL 61 - KCIC MRI | Age: 49
Discharge: HOME | End: 2019-01-07
Payer: COMMERCIAL

## 2019-01-07 DIAGNOSIS — R93.0: Primary | ICD-10-CM

## 2019-01-07 PROCEDURE — 70553 MRI BRAIN STEM W/O & W/DYE: CPT

## 2019-01-07 PROCEDURE — A9585 GADOBUTROL INJECTION: HCPCS

## 2019-01-07 NOTE — [facility]
MRI Brain with and without contrast

 

History: Abnormal CT, focus of density of the third ventricle on previous 

CT, syncopal episode

 

Technique: Multiplanar, multi sequential pre and postcontrast MR imaging 

was performed of the brain.

 

Comparison: May 18, 2018 CT

 

Findings: There is no correlate on MRI with CT finding in the third 

ventricle. Ventricles are not dilated. There is no intra-axial mass 

effect, midline shift, extra-axial fluid collection. There is 

homogeneously enhancing extra-axial mass at the left frontal vertex 

abutting the falx about 1.3 cm transverse by 1.2 cm AP by 0.8 cm cc. There

is no significant edema of the adjacent left frontal parenchyma. There a 

few small foci of nonenhancing T2 and FLAIR hyperintense signal of the 

supratentorial white matter bilaterally greatest most numerous of the left

frontal lobe. There is preservation of the major arterial intracranial 

flow voids at the skull base. 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 There

is mild diffuse thickening of the right mastoid air cells, very minimal 

thickening on the left. There is patchy mild to moderate ethmoid air cell 

mucosal thickening posteriorly. There is very mild maxillary sinus mucosal

thickening. Cerebral volume is within normal limits for the patient's age.

There is no significant hemosiderin deposition of the brain parenchyma.

 

 

Impression:

1. There is extra-axial enhancing mass at the left frontal vertex abutting

the falx, most likely a meningioma.

2. There is no MRI correlate for findings of the third ventricle as 

described for previous CT.

3. Very mild T2 and FLAIR hyperintense signal abnormality of the 

supratentorial parenchyma is nonspecific. White matter changes can be seen

in patients with migraine headaches. Pattern is not particularly 

suggestive of an inflammatory demyelinating disease. Sequela of chronic 

microvascular ischemic disease would be a consideration if risk factors 

such as hypertension or diabetes.

 

Electronically signed by: Rad Thapa MD (1/7/2019 3:40 PM) [facility]

## 2019-02-06 ENCOUNTER — HOSPITAL ENCOUNTER (OUTPATIENT)
Dept: HOSPITAL 61 - KCIC MRI | Age: 49
Discharge: HOME | End: 2019-02-06
Payer: COMMERCIAL

## 2019-02-06 DIAGNOSIS — R22.1: Primary | ICD-10-CM

## 2019-02-06 PROCEDURE — 70543 MRI ORBT/FAC/NCK W/O &W/DYE: CPT

## 2019-02-06 PROCEDURE — A9585 GADOBUTROL INJECTION: HCPCS

## 2019-02-06 NOTE — [facility]
MRI of the neck without and with contrast 2/6/2019

 

CLINICAL HISTORY: Lump on left side of neck. History of mass seen in 

muscle within the left neck on a CT scan.

 

TECHNIQUE: Unenhanced T1-weighted sagittal and axial and coronal and fat 

saturated T2-weighted axial and coronal images of the neck were obtained. 

After the intravenous administration of 9 cc of Gadavist, enhanced fat 

saturated T1 weighted sagittal axial and coronal images of the neck were 

obtained.

 

A MRI compatible marker was placed in the left neck in the area where the 

patient feels a palpable abnormality.

 

FINDINGS: Comparison is made to an ultrasound of the neck dated 2/3/2016. 

 

The patient's outside CT scan is unavailable for comparison.

 

Some of the images are degraded by patient motion.

 

The mucosal pattern of the nasopharynx, oropharynx, hypopharynx and larynx

are within normal limits. Mild to moderate mucosal thickening is seen 

throughout the paranasal sinuses. There are small to moderate-sized 

bilateral mastoid effusions, right greater than left. The parotid and 

submandibular glands are within normal limits. The thyroid gland is within

normal limits. Slightly prominent likely reactive lymph nodes are seen 

scattered throughout the neck. These measure 1 to 2 cm in size.

 

Small reactive lymph nodes are seen immediately posterior to the left 

sternocleidomastoid muscle within subcutaneous fat which appear to 

correspond to the patient's palpable abnormality. These measure 4 to 8 mm 

in size.

 

Posteriorly, within the left paraspinal musculature in the suprahyoid 

neck, an oval-shaped high signal intensity lesion is seen which measures 

2.2 cm in greatest diameter on the T2-weighted images. This demonstrates 

decreased signal intensity to muscle on the T1-weighted images. Faint 

enhancement is seen. The MRI appearance is felt to be most consistent with

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 No 

additional soft tissue mass is seen.

 

IMPRESSION:

1. Small reactive lymph nodes are seen within the left neck which appear 

to correspond to the patient's palpable abnormality.

2. 2.2 cm well-defined oval-shaped high signal intensity lesion is seen 

within the left paraspinal musculature on the T2-weighted images which is 

felt to most likely represent a  benign lesion as outlined above.

 

Electronically signed by: Jayson Freitas MD (2/6/2019 5:09 PM) [facility]

## 2019-12-30 ENCOUNTER — HOSPITAL ENCOUNTER (OUTPATIENT)
Dept: HOSPITAL 63 - MAMMO | Age: 49
Discharge: HOME | End: 2019-12-30
Attending: INTERNAL MEDICINE
Payer: COMMERCIAL

## 2019-12-30 DIAGNOSIS — Z12.31: Primary | ICD-10-CM

## 2019-12-30 PROCEDURE — 77067 SCR MAMMO BI INCL CAD: CPT

## 2019-12-30 PROCEDURE — 77063 BREAST TOMOSYNTHESIS BI: CPT

## 2020-01-08 NOTE — RAD
DATE: 12/30/2019



EXAM: MAMMO TERI SCREENING BILATERAL



HISTORY: Routine screening



COMPARISON: 2/12/2015 mammographic exam



This study was interpreted with the benefit of Computerized Aided Detection

(CAD).





Breast Density: SCATTERED The breast parenchyma shows scattered fibroglandular

densities. Breast parenchyma level B.





FINDINGS: No suspicious calcification or distortion. No suspicious or new

mass.  





IMPRESSION: Stable.







BI-RADS CATEGORY: 1 NEGATIVE



RECOMMENDED FOLLOW-UP: 12M 12 MONTH FOLLOW-UP.



PQRS compliance statement: Patient information was entered into a reminder

system with a target due date for the next mammogram.



Mammography is a sensitive method for finding small breast cancers, but it

does not detect them all and is not a substitute for careful clinical

examination.  A negative mammogram does not negate a clinically suspicious

finding and should not result in delay in biopsying a clinically suspicious

abnormality.



"Our facility is accredited by the [employer] Mammography

Program."

## 2021-07-28 ENCOUNTER — HOSPITAL ENCOUNTER (OUTPATIENT)
Dept: HOSPITAL 63 - RAD | Age: 51
End: 2021-07-28
Attending: PHYSICIAN ASSISTANT
Payer: COMMERCIAL

## 2021-07-28 DIAGNOSIS — M19.012: Primary | ICD-10-CM

## 2021-07-28 DIAGNOSIS — M25.712: ICD-10-CM

## 2021-07-28 PROCEDURE — 73030 X-RAY EXAM OF SHOULDER: CPT

## 2021-07-28 NOTE — RAD
EXAM: 3 Views Left Shoulder



DATE: 7/28/2021 12:18 PM



INDICATION: Reason: SHOULDER PAIN / Spl. Instructions:  / History: 



COMPARISON: No Prior



FINDINGS:

There is no evidence for acute fracture or dislocation. AC joint is congruent. Small glenoid osteophy
ricardo. Humeral head is not high riding. 



IMPRESSION:

1.  No acute fracture or dislocation.

2.   Mild left glenohumeral joint osteoarthritis.



Electronically signed by: Humza Sanders MD (7/28/2021 3:40 PM) [id number]

## 2021-08-23 ENCOUNTER — HOSPITAL ENCOUNTER (OUTPATIENT)
Dept: HOSPITAL 61 - KCIC MRI | Age: 51
End: 2021-08-23
Attending: PHYSICIAN ASSISTANT
Payer: COMMERCIAL

## 2021-08-23 DIAGNOSIS — M75.112: Primary | ICD-10-CM

## 2021-08-23 DIAGNOSIS — M25.812: ICD-10-CM

## 2021-08-23 DIAGNOSIS — M19.012: ICD-10-CM

## 2021-08-23 PROCEDURE — 73221 MRI JOINT UPR EXTREM W/O DYE: CPT

## 2021-08-23 NOTE — [facility]
Exam Date:  8/23/2021 1:05 PM



MRI LEFT UPPER EXTREMITY JOINT WITHOUT CONTRAST



Indication: Reason: ACUTE PAIN OF LEFT SHOULDER / Spl. Instructions:  / History: Injury in July. Pain
 and LROM after a yanking injury.. 



TECHNIQUE: Routine multiplanar MR imaging of the shoulder was performed without contrast.



COMPARISON: Radiographs from July 28, 2021



FINDINGS:



Motion artifact limits evaluation.



There is a partial-thickness articular sided tear involving the insertional fibers at the junction of
 the supraspinatus and infraspinatus tendons measuring approximately 11 x 7 mm.  No full-thickness ro
tator cuff tendon tear is identified.  Supraspinatus and infraspinatus tendinopathy is noted.  The te
res minor and subscapularis tendons are intact.  Rotator cuff musculature demonstrates normal signal 
and bulk.  



Mild degenerative changes are seen at the AC joint.  There is an intact type II acromion.  Small flui
d is seen in the subacromial/subdeltoid bursa consistent with mild bursitis.



Long head of the biceps tendon is intact.  Degenerative signal seen involving the superior labrum, li
alicia with a superior labral tear, though lack of intra-articular contrast limits evaluation.



Mild to moderate degenerative changes are seen at the glenohumeral joint.  Bone marrow demonstrates b
enign signal on all sequences without acute fracture.



IMPRESSION:



Partial thickness articular sided tear involving the insertional fibers at the junction of the supras
pinatus and infraspinatus tendons.  No full-thickness rotator cuff tendon tear is identified.  Supras
pinatus and infraspinatus tendinopathy is noted.



Mild subacromial/subdeltoid bursitis.



Degenerative changes noted, likely with a superior labral tear, though evaluation of the labrum is li
mited in the absence of intra-articular contrast.  If clinically indicated, the labrum could be furth
er evaluated with MR arthrogram.



Electronically signed by: Jerad Holland MD (8/23/2021 3:25 PM) [facility]